# Patient Record
Sex: FEMALE | Race: WHITE | Employment: FULL TIME | URBAN - METROPOLITAN AREA
[De-identification: names, ages, dates, MRNs, and addresses within clinical notes are randomized per-mention and may not be internally consistent; named-entity substitution may affect disease eponyms.]

---

## 2020-03-21 ENCOUNTER — HOSPITAL ENCOUNTER (EMERGENCY)
Facility: CLINIC | Age: 35
Discharge: PSYCHIATRIC OR SUBSTANCE USE FACILITY | End: 2020-03-21
Attending: EMERGENCY MEDICINE | Admitting: EMERGENCY MEDICINE
Payer: COMMERCIAL

## 2020-03-21 VITALS
TEMPERATURE: 98.4 F | OXYGEN SATURATION: 97 % | RESPIRATION RATE: 16 BRPM | WEIGHT: 125 LBS | HEART RATE: 140 BPM | DIASTOLIC BLOOD PRESSURE: 79 MMHG | SYSTOLIC BLOOD PRESSURE: 121 MMHG

## 2020-03-21 DIAGNOSIS — F10.21 HISTORY OF ALCOHOLISM (H): ICD-10-CM

## 2020-03-21 DIAGNOSIS — F10.929 ALCOHOLIC INTOXICATION WITH COMPLICATION (H): ICD-10-CM

## 2020-03-21 LAB
ALBUMIN SERPL-MCNC: 4.3 G/DL (ref 3.4–5)
ALP SERPL-CCNC: 70 U/L (ref 40–150)
ALT SERPL W P-5'-P-CCNC: 45 U/L (ref 0–50)
AMPHETAMINES UR QL SCN: NEGATIVE
ANION GAP SERPL CALCULATED.3IONS-SCNC: 8 MMOL/L (ref 3–14)
AST SERPL W P-5'-P-CCNC: 44 U/L (ref 0–45)
BARBITURATES UR QL: NEGATIVE
BASOPHILS # BLD AUTO: 0 10E9/L (ref 0–0.2)
BASOPHILS NFR BLD AUTO: 0.5 %
BENZODIAZ UR QL: NEGATIVE
BILIRUB SERPL-MCNC: 0.4 MG/DL (ref 0.2–1.3)
BUN SERPL-MCNC: 8 MG/DL (ref 7–30)
CALCIUM SERPL-MCNC: 8.7 MG/DL (ref 8.5–10.1)
CANNABINOIDS UR QL SCN: NEGATIVE
CHLORIDE SERPL-SCNC: 100 MMOL/L (ref 94–109)
CO2 SERPL-SCNC: 28 MMOL/L (ref 20–32)
COCAINE UR QL: NEGATIVE
CREAT SERPL-MCNC: 0.58 MG/DL (ref 0.52–1.04)
DIFFERENTIAL METHOD BLD: NORMAL
EOSINOPHIL # BLD AUTO: 0.2 10E9/L (ref 0–0.7)
EOSINOPHIL NFR BLD AUTO: 2.6 %
ERYTHROCYTE [DISTWIDTH] IN BLOOD BY AUTOMATED COUNT: 12.5 % (ref 10–15)
ETHANOL SERPL-MCNC: 0.5 G/DL
GFR SERPL CREATININE-BSD FRML MDRD: >90 ML/MIN/{1.73_M2}
GLUCOSE SERPL-MCNC: 99 MG/DL (ref 70–99)
HCG UR QL: NEGATIVE
HCT VFR BLD AUTO: 45.3 % (ref 35–47)
HGB BLD-MCNC: 15.3 G/DL (ref 11.7–15.7)
IMM GRANULOCYTES # BLD: 0 10E9/L (ref 0–0.4)
IMM GRANULOCYTES NFR BLD: 0.2 %
LYMPHOCYTES # BLD AUTO: 1.7 10E9/L (ref 0.8–5.3)
LYMPHOCYTES NFR BLD AUTO: 29.6 %
MCH RBC QN AUTO: 32.1 PG (ref 26.5–33)
MCHC RBC AUTO-ENTMCNC: 33.8 G/DL (ref 31.5–36.5)
MCV RBC AUTO: 95 FL (ref 78–100)
MONOCYTES # BLD AUTO: 0.4 10E9/L (ref 0–1.3)
MONOCYTES NFR BLD AUTO: 6.1 %
NEUTROPHILS # BLD AUTO: 3.5 10E9/L (ref 1.6–8.3)
NEUTROPHILS NFR BLD AUTO: 61 %
NRBC # BLD AUTO: 0 10*3/UL
NRBC BLD AUTO-RTO: 0 /100
OPIATES UR QL SCN: NEGATIVE
PCP UR QL SCN: NEGATIVE
PLATELET # BLD AUTO: 220 10E9/L (ref 150–450)
POTASSIUM SERPL-SCNC: 3.8 MMOL/L (ref 3.4–5.3)
PROT SERPL-MCNC: 8.1 G/DL (ref 6.8–8.8)
RBC # BLD AUTO: 4.77 10E12/L (ref 3.8–5.2)
SODIUM SERPL-SCNC: 136 MMOL/L (ref 133–144)
WBC # BLD AUTO: 5.7 10E9/L (ref 4–11)

## 2020-03-21 PROCEDURE — 96376 TX/PRO/DX INJ SAME DRUG ADON: CPT

## 2020-03-21 PROCEDURE — 81025 URINE PREGNANCY TEST: CPT | Performed by: EMERGENCY MEDICINE

## 2020-03-21 PROCEDURE — 99285 EMERGENCY DEPT VISIT HI MDM: CPT | Mod: 25

## 2020-03-21 PROCEDURE — 80307 DRUG TEST PRSMV CHEM ANLYZR: CPT | Performed by: EMERGENCY MEDICINE

## 2020-03-21 PROCEDURE — 25000128 H RX IP 250 OP 636: Performed by: FAMILY MEDICINE

## 2020-03-21 PROCEDURE — 80320 DRUG SCREEN QUANTALCOHOLS: CPT | Performed by: EMERGENCY MEDICINE

## 2020-03-21 PROCEDURE — 96365 THER/PROPH/DIAG IV INF INIT: CPT

## 2020-03-21 PROCEDURE — 99285 EMERGENCY DEPT VISIT HI MDM: CPT | Mod: Z6 | Performed by: EMERGENCY MEDICINE

## 2020-03-21 PROCEDURE — 96375 TX/PRO/DX INJ NEW DRUG ADDON: CPT

## 2020-03-21 PROCEDURE — 25000128 H RX IP 250 OP 636: Performed by: EMERGENCY MEDICINE

## 2020-03-21 PROCEDURE — 96361 HYDRATE IV INFUSION ADD-ON: CPT

## 2020-03-21 PROCEDURE — 25000125 ZZHC RX 250: Performed by: EMERGENCY MEDICINE

## 2020-03-21 PROCEDURE — 25800030 ZZH RX IP 258 OP 636: Performed by: EMERGENCY MEDICINE

## 2020-03-21 PROCEDURE — 96366 THER/PROPH/DIAG IV INF ADDON: CPT

## 2020-03-21 PROCEDURE — 85025 COMPLETE CBC W/AUTO DIFF WBC: CPT | Performed by: EMERGENCY MEDICINE

## 2020-03-21 PROCEDURE — 80053 COMPREHEN METABOLIC PANEL: CPT | Performed by: EMERGENCY MEDICINE

## 2020-03-21 RX ORDER — ONDANSETRON 2 MG/ML
4 INJECTION INTRAMUSCULAR; INTRAVENOUS
Status: COMPLETED | OUTPATIENT
Start: 2020-03-21 | End: 2020-03-21

## 2020-03-21 RX ORDER — LORAZEPAM 2 MG/ML
1-2 INJECTION INTRAMUSCULAR
Status: DISCONTINUED | OUTPATIENT
Start: 2020-03-21 | End: 2020-03-21 | Stop reason: HOSPADM

## 2020-03-21 RX ADMIN — SODIUM CHLORIDE, POTASSIUM CHLORIDE, SODIUM LACTATE AND CALCIUM CHLORIDE 1000 ML: 600; 310; 30; 20 INJECTION, SOLUTION INTRAVENOUS at 13:50

## 2020-03-21 RX ADMIN — ONDANSETRON 4 MG: 2 INJECTION INTRAMUSCULAR; INTRAVENOUS at 15:45

## 2020-03-21 RX ADMIN — LORAZEPAM 1 MG: 2 INJECTION INTRAMUSCULAR; INTRAVENOUS at 15:59

## 2020-03-21 RX ADMIN — FOLIC ACID: 5 INJECTION, SOLUTION INTRAMUSCULAR; INTRAVENOUS; SUBCUTANEOUS at 14:04

## 2020-03-21 RX ADMIN — ONDANSETRON 4 MG: 2 INJECTION INTRAMUSCULAR; INTRAVENOUS at 13:53

## 2020-03-21 ASSESSMENT — ENCOUNTER SYMPTOMS
GASTROINTESTINAL NEGATIVE: 1
MUSCULOSKELETAL NEGATIVE: 1
HEMATOLOGIC/LYMPHATIC NEGATIVE: 1
EYES NEGATIVE: 1
CARDIOVASCULAR NEGATIVE: 1
ALLERGIC/IMMUNOLOGIC NEGATIVE: 1
NEUROLOGICAL NEGATIVE: 1
RESPIRATORY NEGATIVE: 1
PSYCHIATRIC NEGATIVE: 1
ENDOCRINE NEGATIVE: 1
CONSTITUTIONAL NEGATIVE: 1

## 2020-03-21 NOTE — DISCHARGE INSTRUCTIONS
1) After a period of observation, it is safe for you to go to [facility] to begin your treatment.    2) best wishes in treatment

## 2020-03-21 NOTE — ED AVS SNAPSHOT
[facility] Emergency Department  [address] [address]  Phone:  [phone]  Fax:  [phone]                                    Lucita Pineda   MRN: [id number]    Department:  [facility] Emergency Department   Date of Visit:  3/21/2020           After Visit Summary Signature Page    I have received my discharge instructions, and my questions have been answered. I have discussed any challenges I see with this plan with the nurse or doctor.    ..........................................................................................................................................  Patient/Patient Representative Signature      ..........................................................................................................................................  Patient Representative Print Name and Relationship to Patient    ..................................................               ................................................  Date                                   Time    ..........................................................................................................................................  Reviewed by Signature/Title    ...................................................              ..............................................  Date                                               Time          22EPIC Rev 08/18

## 2020-03-21 NOTE — ED PROVIDER NOTES
"  History     Chief Complaint   Patient presents with     Alcohol Intoxication     was checking into [facility] and ETOH was 0.4 so was sent here to be medically cleared     Suicidal     HPI  Lucita Pineda is a 35 year old female who presents by car with her  for further evaluation and care.  Patient has a history of alcoholism.  Her drink of choice is typically wine, vodka and beer.  History is obtained partially from the patient and by telephone from her - Raul Pineda-who drove patient to [facility] for her intake admission today at noon. Raul reports  patient was noted to have a blood alcohol level of 0.4 by breathalyzer \"highest it reads\" and brings her to the emergency department for medical clearance prior to inpatient admission for treatment.  Raul reports patient was in [facility] a month earlier but had to discharge early (after a 2 1/2 day stay) due to medical insurance reasons-\" insurance will not pay because her liver enzymes were normal\".  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  Lives in [facility].  Has 2 daughters. Last worked a week ago, was scheduled to work this weekend according to Raul.  Currently prescribed sertraline 50 mg daily, metoprolol 100 mg daily, hydroxyzine 10 mg as needed may use up to 5 tablets/day for anxiety and sleep and also trazodone 50 mg nightly.  By reports patient stated she was not in a good place the last 24 hours.  She had a hard time getting out of bed this morning and had panicking and fainting and was throwing up.  She has had alcohol withdrawal symptoms but never had alcohol withdrawal seizures.  Prior history of drug use.  No prior history of hospitalization for mental health reasons.  Patient had an unwitnessed fall about 2 days ago according to Raul.  Fall was not witnessed.  There is no known head injury.  She has ecchymosis about her extremity    Allergies:  No Known Allergies    Problem List:    There are no active " problems to display for this patient.       Past Medical History:    No past medical history on file.    Past Surgical History:    No past surgical history on file.    Family History:    No family history on file.    Social History:  Marital Status:    Social History     Tobacco Use     Smoking status: Not on file   Substance Use Topics     Alcohol use: Not on file     Drug use: Not on file        Medications:    No current outpatient medications on file.        Review of Systems   Constitutional: Negative.    HENT: Negative.    Eyes: Negative.    Respiratory: Negative.    Cardiovascular: Negative.    Gastrointestinal: Negative.    Endocrine: Negative.    Genitourinary: Negative.    Musculoskeletal: Negative.    Skin: Negative.    Allergic/Immunologic: Negative.    Neurological: Negative.    Hematological: Negative.    Psychiatric/Behavioral: Negative.    All other systems reviewed and are negative.      Physical Exam   BP: (!) 145/96  Pulse: 140  Temp: 98.4  F (36.9  C)  Resp: 18  SpO2: 95 %      Physical Exam  Constitutional:       General: She is sleeping. She is not in acute distress (intoxicated).     Appearance: She is not ill-appearing, toxic-appearing or diaphoretic.   HENT:      Head: Normocephalic and atraumatic.   Cardiovascular:      Rate and Rhythm: Normal rate and regular rhythm.   Skin:     Coloration: Skin is not jaundiced or pale.      Findings: No bruising, erythema, lesion or rash.   Neurological:      General: No focal deficit present.      Mental Status: She is oriented to person, place, and time and easily aroused.      GCS: GCS eye subscore is 4. GCS verbal subscore is 5. GCS motor subscore is 6.      Sensory: No sensory deficit.      Coordination: Coordination normal.      Deep Tendon Reflexes: Reflexes normal.   Psychiatric:         Mood and Affect: Mood normal.         Behavior: Behavior normal.         Thought Content: Thought content normal.         Judgment: Judgment normal.         ED  Course        Procedures               Critical Care time:  none               ED medications:  Medications   lactated ringers 1,000 mL with Infuvite Adult 10 mL, thiamine 100 mg, folic acid 1 mg, magnesium sulfate 2 g infusion ( Intravenous New Bag 3/21/20 1404)   lactated ringers BOLUS 1,000 mL (1,000 mLs Intravenous Not Given 3/21/20 1353)   ondansetron (ZOFRAN) injection 4 mg (4 mg Intravenous Given 3/21/20 1353)         ED Vitals:  Vitals:    03/21/20 1252   BP: (!) 145/96   Pulse: 140   Resp: 18   Temp: 98.4  F (36.9  C)   TempSrc: Temporal   SpO2: 95%       ED labs and imaging:  Results for orders placed or performed during the hospital encounter of 03/21/20   CBC with platelets differential     Status: None   Result Value Ref Range    WBC 5.7 4.0 - 11.0 10e9/L    RBC Count 4.77 3.8 - 5.2 10e12/L    Hemoglobin 15.3 11.7 - 15.7 g/dL    Hematocrit 45.3 35.0 - 47.0 %    MCV 95 78 - 100 fl    MCH 32.1 26.5 - 33.0 pg    MCHC 33.8 31.5 - 36.5 g/dL    RDW 12.5 10.0 - 15.0 %    Platelet Count 220 150 - 450 10e9/L    Diff Method Automated Method     % Neutrophils 61.0 %    % Lymphocytes 29.6 %    % Monocytes 6.1 %    % Eosinophils 2.6 %    % Basophils 0.5 %    % Immature Granulocytes 0.2 %    Nucleated RBCs 0 0 /100    Absolute Neutrophil 3.5 1.6 - 8.3 10e9/L    Absolute Lymphocytes 1.7 0.8 - 5.3 10e9/L    Absolute Monocytes 0.4 0.0 - 1.3 10e9/L    Absolute Eosinophils 0.2 0.0 - 0.7 10e9/L    Absolute Basophils 0.0 0.0 - 0.2 10e9/L    Abs Immature Granulocytes 0.0 0 - 0.4 10e9/L    Absolute Nucleated RBC 0.0    Comprehensive metabolic panel     Status: None   Result Value Ref Range    Sodium 136 133 - 144 mmol/L    Potassium 3.8 3.4 - 5.3 mmol/L    Chloride 100 94 - 109 mmol/L    Carbon Dioxide 28 20 - 32 mmol/L    Anion Gap 8 3 - 14 mmol/L    Glucose 99 70 - 99 mg/dL    Urea Nitrogen 8 7 - 30 mg/dL    Creatinine 0.58 0.52 - 1.04 mg/dL    GFR Estimate >90 >60 mL/min/[1.73_m2]    GFR Estimate If Black >90 >60  "mL/min/[1.73_m2]    Calcium 8.7 8.5 - 10.1 mg/dL    Bilirubin Total 0.4 0.2 - 1.3 mg/dL    Albumin 4.3 3.4 - 5.0 g/dL    Protein Total 8.1 6.8 - 8.8 g/dL    Alkaline Phosphatase 70 40 - 150 U/L    ALT 45 0 - 50 U/L    AST 44 0 - 45 U/L   Alcohol level blood     Status: Abnormal   Result Value Ref Range    Ethanol g/dL 0.50 () <0.01 g/dL         Assessments & Plan (with Medical Decision Making)   Clinical impression: 35 year old female who presented from [facility] for medical clearance she was dropped off by her -Raul Pineda in the department after she was denied inpatient admission at intake due to BAL-0.4 \"the highest the breatyrlized will go\" Patient with acute alcohol intoxication in the context of history of alcoholism.  No discrete thoughts of suicide expressed on my assessment although she expressed some passive thoughts of not feeling right to her  prior to presenting to the department for care.  Anticipate transfer to [facility] for further treatment.  Final disposition pending at the time of shift end and handoff with goal to allow patient to metabolize her alcohol.    Additional history was obtained by phone from the - Raul Pineda. See detail in HPI above. On my exam patient was clinically intoxicated.  She was drowsy but arousable.  GCS was 15, was tachycardic at rest.  Afebrile.  Arrival blood pressure was 145/96.  95% on room air.      ED course and Plan:  Spoke with spouse-Raul Pineda at [phone]- see detail in HPI. BAL by breathylizer was 0.4 at [facility].  Patient is observed to allow her to metabolize her alcohol.  With history of alcoholism and reported history of mild alcohol withdrawal synmptoms  she is placed on a health officer hold and CIWA protocol initiated during her ED course.  She was given IV fluids banana bag, Zofran made available.    Blood alcohol on arrival 0.505.  Normal electrolytes.  Normal liver enzymes. I spoke with Dr Church- on-duty provider at " [facility] we discussed the plan of care and goals of care which is allow the patient to metabolize her alcohol and that she may be safe to present for intake assessment and admission once her blood alcohol is around 0.3. Dr Church anticipates patient will be transferred for further care and is expecting patient to be admitted for intake and for treatment later this evening.    Patient is signed out to Dr MARISELA Johnson at shift end at 3.15PM awaiting transfer to [facility] for treatment. Patient will be picked up by Raul Pineda- - who can be called at [phone] once clinically sober or BAL at 0.3.  Patient is low risk for alcohol withdrawal syndrome  although may exhibit some mild symptoms and this can be treated symptomatically as clinical indicated.       Disclaimer: This note consists of symbols derived from keyboarding, dictation and/or voice recognition software. As a result, there may be errors in the script that have gone undetected. Please consider this when interpreting information found in this chart.  I have reviewed the nursing notes.    I have reviewed the findings, diagnosis, plan and need for follow up with the patient.       New Prescriptions    No medications on file       Final diagnoses:   Alcoholic intoxication with complication (H)   History of alcoholism (H)       3/21/2020   [facility] EMERGENCY DEPARTMENT     Matti Mahre MD  03/21/20 8753

## 2020-03-21 NOTE — ED PROVIDER NOTES
Emergency Department Patient Sign-out       Brief HPI:  This is a 35 year old female signed out to me by Dr. Maher .  See initial ED Provider note for details of the presentation.   Patient had arrived here with a history of alcohol abuse and [facility] but breathalyzer at [facility] was over 0.4 which is their limit for accepting a patient.  She had then arrived here for adequate clearance and has been sobering here and cleared for discharge to [facility] once the blood alcohol is less than 0.3.    Please see Dr. Maher's note for further details.           Significant Events prior to my assuming care: none      Exam:   Patient Vitals for the past 24 hrs:   BP Temp Temp src Pulse Heart Rate Resp SpO2   03/21/20 1603 115/76 -- -- -- 88 16 99 %   03/21/20 1252 (!) 145/96 98.4  F (36.9  C) Temporal 140 -- 18 95 %           ED RESULTS:   Results for orders placed or performed during the hospital encounter of 03/21/20 (from the past 24 hour(s))   CBC with platelets differential     Status: None    Collection Time: 03/21/20  1:39 PM   Result Value Ref Range    WBC 5.7 4.0 - 11.0 10e9/L    RBC Count 4.77 3.8 - 5.2 10e12/L    Hemoglobin 15.3 11.7 - 15.7 g/dL    Hematocrit 45.3 35.0 - 47.0 %    MCV 95 78 - 100 fl    MCH 32.1 26.5 - 33.0 pg    MCHC 33.8 31.5 - 36.5 g/dL    RDW 12.5 10.0 - 15.0 %    Platelet Count 220 150 - 450 10e9/L    Diff Method Automated Method     % Neutrophils 61.0 %    % Lymphocytes 29.6 %    % Monocytes 6.1 %    % Eosinophils 2.6 %    % Basophils 0.5 %    % Immature Granulocytes 0.2 %    Nucleated RBCs 0 0 /100    Absolute Neutrophil 3.5 1.6 - 8.3 10e9/L    Absolute Lymphocytes 1.7 0.8 - 5.3 10e9/L    Absolute Monocytes 0.4 0.0 - 1.3 10e9/L    Absolute Eosinophils 0.2 0.0 - 0.7 10e9/L    Absolute Basophils 0.0 0.0 - 0.2 10e9/L    Abs Immature Granulocytes 0.0 0 - 0.4 10e9/L    Absolute Nucleated RBC 0.0    Comprehensive metabolic panel     Status: None    Collection Time:  03/21/20  1:39 PM   Result Value Ref Range    Sodium 136 133 - 144 mmol/L    Potassium 3.8 3.4 - 5.3 mmol/L    Chloride 100 94 - 109 mmol/L    Carbon Dioxide 28 20 - 32 mmol/L    Anion Gap 8 3 - 14 mmol/L    Glucose 99 70 - 99 mg/dL    Urea Nitrogen 8 7 - 30 mg/dL    Creatinine 0.58 0.52 - 1.04 mg/dL    GFR Estimate >90 >60 mL/min/[1.73_m2]    GFR Estimate If Black >90 >60 mL/min/[1.73_m2]    Calcium 8.7 8.5 - 10.1 mg/dL    Bilirubin Total 0.4 0.2 - 1.3 mg/dL    Albumin 4.3 3.4 - 5.0 g/dL    Protein Total 8.1 6.8 - 8.8 g/dL    Alkaline Phosphatase 70 40 - 150 U/L    ALT 45 0 - 50 U/L    AST 44 0 - 45 U/L   Alcohol level blood     Status: Abnormal    Collection Time: 03/21/20  1:39 PM   Result Value Ref Range    Ethanol g/dL 0.50 (HH) <0.01 g/dL   Drug Screen Urine     Status: None    Collection Time: 03/21/20  3:35 PM   Result Value Ref Range    Amphetamine Qual Urine Negative NEG^Negative    Barbiturates Qual Urine Negative NEG^Negative    Benzodiazepine Qual Urine Negative NEG^Negative    Cannabinoids Qual Urine Negative NEG^Negative    Cocaine Qual Urine Negative NEG^Negative    Opiates Qualitative Urine Negative NEG^Negative    PCP Qual Urine Negative NEG^Negative   HCG qualitative urine     Status: None    Collection Time: 03/21/20  3:35 PM   Result Value Ref Range    HCG Qual Urine Negative NEG^Negative       ED MEDICATIONS:   Medications   LORazepam (ATIVAN) injection 1-2 mg (1 mg Intravenous Given 3/21/20 1559)   lactated ringers 1,000 mL with Infuvite Adult 10 mL, thiamine 100 mg, folic acid 1 mg, magnesium sulfate 2 g infusion ( Intravenous Stopped 3/21/20 1641)   lactated ringers BOLUS 1,000 mL (0 mLs Intravenous Stopped 3/21/20 1518)   ondansetron (ZOFRAN) injection 4 mg (4 mg Intravenous Given 3/21/20 1353)   ondansetron (ZOFRAN) injection 4 mg (4 mg Intravenous Given 3/21/20 1545)     Patient Vitals for the past 24 hrs:   BP Temp Temp src Pulse Heart Rate Resp SpO2   03/21/20 1603 115/76 -- -- -- 88 16  99 %   03/21/20 1252 (!) 145/96 98.4  F (36.9  C) Temporal 140 -- 18 95 %         Impression:    ICD-10-CM    1. Alcoholic intoxication with complication (H)  F10.929 CBC with platelets differential     Comprehensive metabolic panel     Alcohol level blood     Drug Screen Urine     HCG qualitative urine   2. History of alcoholism (H)  F10.21        Plan:    Pending studies include repeat alcohol level.  Patient's recheck alcohol level is 0.24 breathalizer.  Vital signs are stable and safe to return to [facility].  No significant tremor at this time.  Did receive Ativan.      MD Alex Alarcon Scott J, MD  03/21/20 6349

## 2020-03-21 NOTE — ED NOTES
DATE:  3/21/2020   TIME OF RECEIPT FROM LAB:  1812  LAB TEST:  Blood Alcohol Level  LAB VALUE:  0.505  RESULTS GIVEN WITH READ-BACK TO (PROVIDER):  Dr. Maher  TIME LAB VALUE REPORTED TO PROVIDER:   7692

## 2020-03-21 NOTE — ED NOTES
took pt to [facility]. Intake found ETOH at .4 and sent pt to ED.  dropped pt off. She verbalized to triage nurse thoughts of self harm. A MONA was placed and pt watch started at 1301.

## 2020-03-21 NOTE — ED NOTES
[facility] updated on patient status. OK to return.  to transport. Patient brought via wheelchair to  waiting in parking lot.